# Patient Record
Sex: FEMALE | Race: BLACK OR AFRICAN AMERICAN | Employment: UNEMPLOYED | URBAN - METROPOLITAN AREA
[De-identification: names, ages, dates, MRNs, and addresses within clinical notes are randomized per-mention and may not be internally consistent; named-entity substitution may affect disease eponyms.]

---

## 2019-02-28 ENCOUNTER — OFFICE VISIT (OUTPATIENT)
Dept: FAMILY MEDICINE CLINIC | Age: 49
End: 2019-02-28

## 2019-02-28 VITALS
HEIGHT: 67 IN | DIASTOLIC BLOOD PRESSURE: 96 MMHG | OXYGEN SATURATION: 97 % | RESPIRATION RATE: 16 BRPM | SYSTOLIC BLOOD PRESSURE: 177 MMHG | BODY MASS INDEX: 29.82 KG/M2 | HEART RATE: 107 BPM | TEMPERATURE: 97.7 F | WEIGHT: 190 LBS

## 2019-02-28 DIAGNOSIS — I10 ESSENTIAL HYPERTENSION: Primary | ICD-10-CM

## 2019-02-28 DIAGNOSIS — J45.909 UNCOMPLICATED ASTHMA, UNSPECIFIED ASTHMA SEVERITY, UNSPECIFIED WHETHER PERSISTENT: ICD-10-CM

## 2019-02-28 DIAGNOSIS — Z79.899 POLYPHARMACY: ICD-10-CM

## 2019-02-28 DIAGNOSIS — E55.9 VITAMIN D DEFICIENCY: ICD-10-CM

## 2019-02-28 DIAGNOSIS — M50.90 CERVICAL DISC DISORDER: ICD-10-CM

## 2019-02-28 DIAGNOSIS — F31.9 BIPOLAR DEPRESSION (HCC): ICD-10-CM

## 2019-02-28 DIAGNOSIS — F20.9 SCHIZOPHRENIA, UNSPECIFIED TYPE (HCC): ICD-10-CM

## 2019-02-28 DIAGNOSIS — G43.909 MIGRAINE WITHOUT STATUS MIGRAINOSUS, NOT INTRACTABLE, UNSPECIFIED MIGRAINE TYPE: ICD-10-CM

## 2019-02-28 DIAGNOSIS — E03.9 HYPOTHYROIDISM, UNSPECIFIED TYPE: ICD-10-CM

## 2019-02-28 DIAGNOSIS — K21.9 GASTROESOPHAGEAL REFLUX DISEASE, ESOPHAGITIS PRESENCE NOT SPECIFIED: ICD-10-CM

## 2019-02-28 RX ORDER — PREGABALIN 75 MG/1
CAPSULE ORAL
COMMUNITY
End: 2019-04-09

## 2019-02-28 RX ORDER — BENZONATATE 200 MG/1
200 CAPSULE ORAL
COMMUNITY
End: 2019-03-13 | Stop reason: ALTCHOICE

## 2019-02-28 RX ORDER — CYCLOBENZAPRINE HCL 5 MG
5 TABLET ORAL
COMMUNITY
End: 2019-04-09

## 2019-02-28 RX ORDER — ALBUTEROL SULFATE 90 UG/1
AEROSOL, METERED RESPIRATORY (INHALATION)
COMMUNITY
End: 2019-02-28 | Stop reason: SDUPTHER

## 2019-02-28 RX ORDER — ALFUZOSIN HYDROCHLORIDE 10 MG/1
TABLET, EXTENDED RELEASE ORAL DAILY
COMMUNITY
End: 2019-04-09

## 2019-02-28 RX ORDER — RIZATRIPTAN BENZOATE 10 MG/1
10 TABLET ORAL
COMMUNITY
End: 2019-02-28 | Stop reason: SDUPTHER

## 2019-02-28 RX ORDER — FLUTICASONE PROPIONATE AND SALMETEROL 250; 50 UG/1; UG/1
1 POWDER RESPIRATORY (INHALATION) EVERY 12 HOURS
Qty: 14 EACH | Refills: 1 | Status: SHIPPED | OUTPATIENT
Start: 2019-02-28 | End: 2019-04-01

## 2019-02-28 RX ORDER — HYDROXYZINE 25 MG/1
25 TABLET, FILM COATED ORAL
Qty: 90 TAB | Refills: 1 | Status: SHIPPED | OUTPATIENT
Start: 2019-02-28 | End: 2019-03-10

## 2019-02-28 RX ORDER — FLUTICASONE PROPIONATE AND SALMETEROL 250; 50 UG/1; UG/1
1 POWDER RESPIRATORY (INHALATION) EVERY 12 HOURS
COMMUNITY
End: 2019-02-28 | Stop reason: SDUPTHER

## 2019-02-28 RX ORDER — HYDROCHLOROTHIAZIDE 25 MG/1
25 TABLET ORAL DAILY
COMMUNITY
End: 2019-02-28 | Stop reason: SDUPTHER

## 2019-02-28 RX ORDER — LOSARTAN POTASSIUM 50 MG/1
50 TABLET ORAL
COMMUNITY
Start: 2018-05-23 | End: 2019-02-28 | Stop reason: SDUPTHER

## 2019-02-28 RX ORDER — ALBUTEROL SULFATE 90 UG/1
2 AEROSOL, METERED RESPIRATORY (INHALATION)
Qty: 1 INHALER | Refills: 1 | Status: SHIPPED | OUTPATIENT
Start: 2019-02-28 | End: 2019-02-28 | Stop reason: ALTCHOICE

## 2019-02-28 RX ORDER — FESOTERODINE FUMARATE 8 MG/1
8 TABLET, EXTENDED RELEASE ORAL
COMMUNITY
Start: 2018-05-05

## 2019-02-28 RX ORDER — HYDROXYZINE 25 MG/1
TABLET, FILM COATED ORAL
COMMUNITY
End: 2019-02-28 | Stop reason: SDUPTHER

## 2019-02-28 RX ORDER — LEVOTHYROXINE SODIUM 75 UG/1
TABLET ORAL
COMMUNITY
End: 2019-02-28 | Stop reason: SDUPTHER

## 2019-02-28 RX ORDER — ALBUTEROL SULFATE 90 UG/1
AEROSOL, METERED RESPIRATORY (INHALATION)
COMMUNITY
End: 2019-02-28 | Stop reason: ALTCHOICE

## 2019-02-28 RX ORDER — ALFUZOSIN HYDROCHLORIDE 10 MG/1
TABLET, EXTENDED RELEASE ORAL DAILY
Status: CANCELLED | OUTPATIENT
Start: 2019-02-28

## 2019-02-28 RX ORDER — ALBUTEROL SULFATE 90 UG/1
1-2 AEROSOL, METERED RESPIRATORY (INHALATION)
COMMUNITY
Start: 2019-02-19 | End: 2019-02-28 | Stop reason: ALTCHOICE

## 2019-02-28 RX ORDER — TRAZODONE HYDROCHLORIDE 150 MG/1
150 TABLET ORAL
COMMUNITY
End: 2019-02-28 | Stop reason: SDUPTHER

## 2019-02-28 RX ORDER — SUMATRIPTAN 100 MG/1
100 TABLET, FILM COATED ORAL
COMMUNITY
Start: 2018-05-23

## 2019-02-28 RX ORDER — TRAZODONE HYDROCHLORIDE 150 MG/1
150 TABLET ORAL
Qty: 90 TAB | Refills: 1 | Status: SHIPPED | OUTPATIENT
Start: 2019-02-28 | End: 2019-04-01

## 2019-02-28 RX ORDER — ALPRAZOLAM 1 MG/1
TABLET ORAL
Status: CANCELLED | OUTPATIENT
Start: 2019-02-28

## 2019-02-28 RX ORDER — ONDANSETRON 4 MG/1
4 TABLET, FILM COATED ORAL
COMMUNITY

## 2019-02-28 RX ORDER — RANITIDINE 300 MG/1
TABLET ORAL 2 TIMES DAILY
COMMUNITY
End: 2019-02-28 | Stop reason: SDUPTHER

## 2019-02-28 RX ORDER — LEVOTHYROXINE SODIUM 75 UG/1
75 TABLET ORAL
Qty: 90 TAB | Refills: 1 | Status: SHIPPED | OUTPATIENT
Start: 2019-02-28

## 2019-02-28 RX ORDER — HYDROCHLOROTHIAZIDE 25 MG/1
25 TABLET ORAL DAILY
Qty: 90 TAB | Refills: 1 | Status: SHIPPED | OUTPATIENT
Start: 2019-02-28

## 2019-02-28 RX ORDER — ALBUTEROL SULFATE 90 UG/1
2 AEROSOL, METERED RESPIRATORY (INHALATION)
Qty: 1 INHALER | Refills: 3 | Status: SHIPPED | OUTPATIENT
Start: 2019-02-28

## 2019-02-28 RX ORDER — SIMVASTATIN 20 MG/1
20 TABLET, FILM COATED ORAL
COMMUNITY
Start: 2018-05-05

## 2019-02-28 RX ORDER — RANITIDINE 300 MG/1
300 TABLET ORAL DAILY
Qty: 90 TAB | Refills: 1 | Status: SHIPPED | OUTPATIENT
Start: 2019-02-28

## 2019-02-28 RX ORDER — RIZATRIPTAN BENZOATE 10 MG/1
10 TABLET ORAL
Qty: 9 TAB | Refills: 6 | Status: SHIPPED | OUTPATIENT
Start: 2019-02-28 | End: 2019-02-28

## 2019-02-28 RX ORDER — ALPRAZOLAM 1 MG/1
TABLET ORAL
COMMUNITY
End: 2019-04-09

## 2019-02-28 RX ORDER — LOSARTAN POTASSIUM 50 MG/1
50 TABLET ORAL DAILY
Qty: 90 TAB | Refills: 1 | Status: SHIPPED | OUTPATIENT
Start: 2019-02-28 | End: 2019-03-13 | Stop reason: ALTCHOICE

## 2019-02-28 RX ORDER — MORPHINE SULFATE 30 MG/1
TABLET, FILM COATED, EXTENDED RELEASE ORAL
COMMUNITY
Start: 2017-06-10 | End: 2019-04-09

## 2019-02-28 RX ORDER — CHOLECALCIFEROL TAB 125 MCG (5000 UNIT) 125 MCG
TAB ORAL DAILY
COMMUNITY

## 2019-02-28 NOTE — PROGRESS NOTES
[facility]    CC: EOC    HPI:     HTN:  -Not logging BP at home  -Currently only taking losartan  -States her BP was well controlled when was on 4-5 BP medications      Hypothyroidism:  -Reports TSH has not been checked for a long time  -Taking Synthroid as prescribed  -Denies any side effects or issues the medication      Bipolar Depression/Schizophrenia:  -Currently taking Latuda  -Denies any side effects or issues the medication  -Reports it works well  -Also reports issues with anxiety, PTSD, and insomnia  -Taking trazodone to help her sleep      Asthma:  -Currently reports using Advair and Albuterol  -Denies any issues with the medications      Migraines:  -Reports a history of very severe migraines  -Migraines have been difficult to abort  -Rizatriptan has been the only medication effective in stopping the migraines, but thinks it is starting to lose its effectiveness      Chronic Pain:  -Reports chronic back and neck pain  -States that neck pain is notably severe due to failed cervical fusion  -Reports she was going to have surgery  -Needs referral to specialist      ROS: Positive items marked in RED  CON: fever, chills  Cardiovascular: palpitations, CP  Resp: SOB, cough  GI: nausea, vomiting, diarrhea  : dysuria, hematuria      Past Medical History:   Diagnosis Date    Anemia     Arthritis     Asthma     [facility] disorder ([facility] [address]     Cervical herniated disc     Chronic kidney disease     Chronic pain     GERD (gastroesophageal reflux disease)     Heart disease     Hypertension     Lumbar herniated disc     Polypharmacy     Schizophrenia ([facility] [address]        Past Surgical History:   Procedure Laterality Date    HX CERVICAL FUSION      HX GYN      HX HEENT      HX LUMBAR FUSION      HX ORTHOPAEDIC         Family History   Problem Relation Age of Onset    Heart Disease Mother     Anemia Mother     Diabetes Mother     Diabetes Father        Social History     Socioeconomic History  Marital status: UNKNOWN     Spouse name: Not on file    Number of children: Not on file    Years of education: Not on file    Highest education level: Not on file   Tobacco Use    Smoking status: Former Smoker    Smokeless tobacco: Never Used   Substance and Sexual Activity    Alcohol use: No     Frequency: Never    Drug use: No    Sexual activity: Not Currently       Allergies   Allergen Reactions    Amlodipine Other (comments)     Unable to sleep         Current Outpatient Medications:     simvastatin (ZOCOR) 20 mg tablet, Take 20 mg by mouth., Disp: , Rfl:     SUMAtriptan (IMITREX) 100 mg tablet, Take 100 mg by mouth., Disp: , Rfl:     fesoterodine (TOVIAZ) 8 mg ER tablet, Take 8 mg by mouth., Disp: , Rfl:     morphine CR (MS CONTIN) 30 mg CR tablet, TAKE 1 TABLET BY MOUTH TWICE A DAY X1 MONTH (30 DAYS), Disp: , Rfl:     losartan (COZAAR) 50 mg tablet, Take 50 mg by mouth., Disp: , Rfl:     ondansetron hcl (ZOFRAN) 4 mg tablet, Take 4 mg by mouth every eight (8) hours as needed for Nausea., Disp: , Rfl:     raNITIdine (ZANTAC) 300 mg tab, Take  by mouth two (2) times a day., Disp: , Rfl:     hydrOXYzine HCl (ATARAX) 25 mg tablet, Take  by mouth three (3) times daily as needed for Itching., Disp: , Rfl:     pregabalin (LYRICA) 75 mg capsule, Take  by mouth., Disp: , Rfl:     cholecalciferol, VITAMIN D3, (VITAMIN D3) 5,000 unit tab tablet, Take  by mouth daily. , Disp: , Rfl:     alfuzosin SR (UROXATRAL) 10 mg SR tablet, Take  by mouth daily. , Disp: , Rfl:     lactulose (CHRONULAC) 10 gram/15 mL solution, Take  by mouth three (3) times daily. , Disp: , Rfl:     benzonatate (TESSALON) 200 mg capsule, Take 200 mg by mouth three (3) times daily as needed for Cough. , Disp: , Rfl:     cyclobenzaprine (FLEXERIL) 5 mg tablet, Take 5 mg by mouth., Disp: , Rfl:     ALPRAZolam (XANAX) 1 mg tablet, Take  by mouth., Disp: , Rfl:     hydroCHLOROthiazide (HYDRODIURIL) 25 mg tablet, Take 1 Tab by mouth daily., Disp: 90 Tab, Rfl: 1    traZODone (DESYREL) 150 mg tablet, Take 1 Tab by mouth nightly., Disp: 90 Tab, Rfl: 1    levothyroxine (SYNTHROID) 75 mcg tablet, Take 1 Tab by mouth Daily (before breakfast). , Disp: 90 Tab, Rfl: 1    rizatriptan (MAXALT) 10 mg tablet, Take 1 Tab by mouth once as needed for Migraine for up to 1 dose. May repeat in 2 hours if needed, Disp: 9 Tab, Rfl: 6    lurasidone (LATUDA) 40 mg tab tablet, Take 1 Tab by mouth daily (with breakfast). , Disp: 90 Tab, Rfl: 1    fluticasone-salmeterol (ADVAIR DISKUS) 250-50 mcg/dose diskus inhaler, Take 1 Puff by inhalation every twelve (12) hours. , Disp: 14 Each, Rfl: 1    albuterol (PROVENTIL HFA, VENTOLIN HFA, PROAIR HFA) 90 mcg/actuation inhaler, Take 2 Puffs by inhalation every four (4) hours as needed for Wheezing., Disp: 1 Inhaler, Rfl: 3    Physical Exam:      BP (!) 177/96   Pulse (!) 107   Temp 97.7 °F (36.5 °C) (Oral)   Resp 16   Ht 5' 7\" (1.702 m)   Wt 190 lb (86.2 kg)   SpO2 97%   BMI 29.76 kg/m²     General:  WD, WN, NAD, conversant  Eyes: sclera clear bilaterally, no discharge noted, eyelids normal in appearance  HENT: NCAT  Lungs: CTAB, normal respiratory effort and rate  CV: RRR, no MRGs  ABD: soft, non-tender, non-distended, normal bowel sounds  Skin: normal temperature, turgor, color, and texture  Psych: alert and oriented to person, place and situation, seems very anxious, constantly shaking leg  Neuro: speech normal, moving all extremities, gait normal      Assessment/Plan     Essential Hypertension, inadequately controlled:  -Will continue current losartan regimen  -Will resume prior HCTZ regimen  -Advised to start logging home BP  -CMP and CBC  -Follow-up in 1 month      Bipolar depression/Schizophrenia:  -Given referral list to local psychiatrist  -Will continue current trazodone, Latuda, and hydroxyzine regimen  -Follow-up in 1 month      Migraines:  -Will continue rizatriptan for abortive therapy  -Follow-up in 1 month      Vitamin D Deficiency:  -Vitamin D level ordered  -Follow-up in 1 month      Hypothyroidism:  -Will continue current Synthroid regimen  -TSH ordered  -Follow-up in 1 month      Asthma:  -Will continue current controller regimen of Advair  -PRN albuterol inhaler ordered  -Follow-up in 1 month      Cervical Disc Disorder:  -Will refer to orthopedic specialist for further evaluation  -Follow-up in 1 month      GERD:  -Will continue current ranitidine regimen  -Follow-up in 1 month      Polypharmacy:  -Discussed with patient that she was on too many medications and had multiple interactions  -Will not refill of her medications and will work to try to reduce her medication list  -Follow-up in 1 month        Esthela Strickland MD  2/28/2019, 10:12 AM

## 2019-02-28 NOTE — PROGRESS NOTES
Chief Complaint   Patient presents with   Ivett Gimenezer Establish Care     1. Have you been to the ER, urgent care clinic since your last visit? Hospitalized since your last visit? Went to [facility] 1 week ago. 2. Have you seen or consulted any other health care providers outside of the [address] [place] [address] since your last visit? Include any pap smears or colon screening. Saw Orthopedic doctor on 2/27/2019. Health Maintenance:  · Need order for mammogram. Last Mammogram was 4 years ago. · Need referral for colonoscopy screening. · Last PAP is unknown. Patient had full hysterectomy. · Script needed for Shingrix vaccine.     3 most recent PHQ Screens 2/28/2019   Little interest or pleasure in doing things Nearly every day   Feeling down, depressed, irritable, or hopeless Nearly every day   Total Score PHQ 2 6     Visit Vitals  BP (!) 177/96   Pulse (!) 107   Temp 97.7 °F (36.5 °C) (Oral)   Resp 16   Ht 5' 7\" (1.702 m)   Wt 190 lb (86.2 kg)   SpO2 97%   BMI 29.76 kg/m²

## 2019-03-01 LAB
25(OH)D3+25(OH)D2 SERPL-MCNC: 54.1 NG/ML (ref 30–100)
ALBUMIN SERPL-MCNC: 4.6 G/DL (ref 3.5–5.5)
ALBUMIN/GLOB SERPL: 1.7 {RATIO} (ref 1.2–2.2)
ALP SERPL-CCNC: 71 IU/L (ref 39–117)
ALT SERPL-CCNC: 20 IU/L (ref 0–32)
AST SERPL-CCNC: 20 IU/L (ref 0–40)
BASOPHILS # BLD AUTO: 0 X10E3/UL (ref 0–0.2)
BASOPHILS NFR BLD AUTO: 0 %
BILIRUB SERPL-MCNC: 0.4 MG/DL (ref 0–1.2)
BUN SERPL-MCNC: 8 MG/DL (ref 6–24)
BUN/CREAT SERPL: 6 (ref 9–23)
CALCIUM SERPL-MCNC: 9.9 MG/DL (ref 8.7–10.2)
CHLORIDE SERPL-SCNC: 95 MMOL/L (ref 96–106)
CO2 SERPL-SCNC: 30 MMOL/L (ref 20–29)
CREAT SERPL-MCNC: 1.43 MG/DL (ref 0.57–1)
EOSINOPHIL # BLD AUTO: 0.1 X10E3/UL (ref 0–0.4)
EOSINOPHIL NFR BLD AUTO: 2 %
ERYTHROCYTE [DISTWIDTH] IN BLOOD BY AUTOMATED COUNT: 16.8 % (ref 12.3–15.4)
GLOBULIN SER CALC-MCNC: 2.7 G/DL (ref 1.5–4.5)
GLUCOSE SERPL-MCNC: 103 MG/DL (ref 65–99)
HCT VFR BLD AUTO: 33.3 % (ref 34–46.6)
HGB BLD-MCNC: 10.3 G/DL (ref 11.1–15.9)
IMM GRANULOCYTES # BLD AUTO: 0 X10E3/UL (ref 0–0.1)
IMM GRANULOCYTES NFR BLD AUTO: 0 %
INTERPRETATION: NORMAL
LYMPHOCYTES # BLD AUTO: 2.3 X10E3/UL (ref 0.7–3.1)
LYMPHOCYTES NFR BLD AUTO: 51 %
MCH RBC QN AUTO: 22.5 PG (ref 26.6–33)
MCHC RBC AUTO-ENTMCNC: 30.9 G/DL (ref 31.5–35.7)
MCV RBC AUTO: 73 FL (ref 79–97)
MONOCYTES # BLD AUTO: 0.3 X10E3/UL (ref 0.1–0.9)
MONOCYTES NFR BLD AUTO: 8 %
NEUTROPHILS # BLD AUTO: 1.7 X10E3/UL (ref 1.4–7)
NEUTROPHILS NFR BLD AUTO: 39 %
PLATELET # BLD AUTO: 244 X10E3/UL (ref 150–379)
POTASSIUM SERPL-SCNC: 3.6 MMOL/L (ref 3.5–5.2)
PROT SERPL-MCNC: 7.3 G/DL (ref 6–8.5)
RBC # BLD AUTO: 4.58 X10E6/UL (ref 3.77–5.28)
SODIUM SERPL-SCNC: 140 MMOL/L (ref 134–144)
TSH SERPL DL<=0.005 MIU/L-ACNC: 0.45 UIU/ML (ref 0.45–4.5)
WBC # BLD AUTO: 4.4 X10E3/UL (ref 3.4–10.8)

## 2019-03-13 ENCOUNTER — OFFICE VISIT (OUTPATIENT)
Dept: FAMILY MEDICINE CLINIC | Age: 49
End: 2019-03-13

## 2019-03-13 VITALS
TEMPERATURE: 98.1 F | RESPIRATION RATE: 14 BRPM | HEART RATE: 105 BPM | BODY MASS INDEX: 29.66 KG/M2 | HEIGHT: 67 IN | WEIGHT: 189 LBS | DIASTOLIC BLOOD PRESSURE: 110 MMHG | SYSTOLIC BLOOD PRESSURE: 174 MMHG | OXYGEN SATURATION: 98 %

## 2019-03-13 DIAGNOSIS — J06.9 UPPER RESPIRATORY TRACT INFECTION, UNSPECIFIED TYPE: ICD-10-CM

## 2019-03-13 DIAGNOSIS — J45.901 EXACERBATION OF PERSISTENT ASTHMA, UNSPECIFIED ASTHMA SEVERITY: Primary | ICD-10-CM

## 2019-03-13 DIAGNOSIS — I10 ESSENTIAL HYPERTENSION: ICD-10-CM

## 2019-03-13 RX ORDER — CLONIDINE HYDROCHLORIDE 0.1 MG/1
0.1 TABLET ORAL 2 TIMES DAILY
Qty: 60 TAB | Refills: 1 | Status: SHIPPED | OUTPATIENT
Start: 2019-03-13 | End: 2019-04-09 | Stop reason: DRUGHIGH

## 2019-03-13 RX ORDER — PREDNISONE 20 MG/1
60 TABLET ORAL
Qty: 21 TAB | Refills: 0 | Status: SHIPPED | OUTPATIENT
Start: 2019-03-13 | End: 2019-04-02

## 2019-03-13 RX ORDER — LOSARTAN POTASSIUM 100 MG/1
100 TABLET ORAL DAILY
Qty: 90 TAB | Refills: 1 | Status: SHIPPED | OUTPATIENT
Start: 2019-03-13 | End: 2019-04-01

## 2019-03-13 RX ORDER — BROMPHENIRAMINE MALEATE, PSEUDOEPHEDRINE HYDROCHLORIDE, AND DEXTROMETHORPHAN HYDROBROMIDE 2; 30; 10 MG/5ML; MG/5ML; MG/5ML
5 SYRUP ORAL
Qty: 118 ML | Refills: 0 | Status: SHIPPED | OUTPATIENT
Start: 2019-03-13 | End: 2019-04-01 | Stop reason: SDUPTHER

## 2019-03-13 NOTE — PATIENT INSTRUCTIONS

## 2019-03-13 NOTE — PROGRESS NOTES
Chief Complaint   Patient presents with    URI    Elevated Blood Pressure    Migraine     1. Have you been to the ER, urgent care clinic since your last visit? Hospitalized since your last visit? No.    2. Have you seen or consulted any other health care providers outside of the [address] [place] [address] since your last visit?  No.    Visit Vitals  BP (!) 174/110   Pulse (!) 105   Temp 98.1 °F (36.7 °C) (Oral)   Resp 14   Ht 5' 7\" (1.702 m)   Wt 189 lb (85.7 kg)   SpO2 98%   BMI 29.60 kg/m²

## 2019-03-13 NOTE — PROGRESS NOTES
[facility]    CC: Cough    HPI:     Cough:  -Productive (Slight/small amount of sputum. Sputum is clear to yellow-green)  -Reports exposure to sick grandchildren (Lives with her)  -Has been using prescribed Advair, albuterol, and Tessalon  -Reports associated SOB, wheezing, chills, vomiting, chest tightness, rhinorrhea, and nasal congestion  -Of note, she quit smoking 5 months ago      HTN:  -Taking HCTZ as prescribed  -Reports increased urinary frequency from the medication. Does not wish to increase the dose, due to this side effect  -Has been taking losartan BID due to elevated BP. Denies any side effect from the medication.  -Has been checking BP at home. Log brought in for review.  All readings were stage II HTN except one low BP reading of 96/54  -Home BP range of /      ROS: Positive items marked in RED  CON: fever, chills  Cardiovascular: palpitations, CP  Resp: SOB, cough  GI: vomiting, diarrhea  : dysuria, hematuria      Past Medical History:   Diagnosis Date    Anemia     Arthritis     Asthma     Bipolar disorder (HCC)     Cervical herniated disc     Chronic kidney disease     Chronic pain     GERD (gastroesophageal reflux disease)     Heart disease     Hypertension     Hypothyroidism     Lumbar herniated disc     Polypharmacy     PTSD (post-traumatic stress disorder)     Schizophrenia ([facility] [address]        Past Surgical History:   Procedure Laterality Date    HX CERVICAL FUSION      HX GYN      HX HEENT      HX LUMBAR FUSION      HX ORTHOPAEDIC         Family History   Problem Relation Age of Onset    Heart Disease Mother     Anemia Mother     Diabetes Mother     Diabetes Father        Social History     Socioeconomic History    Marital status: UNKNOWN     Spouse name: Not on file    Number of children: Not on file    Years of education: Not on file    Highest education level: Not on file   Tobacco Use    Smoking status: Former Smoker    Smokeless tobacco: Never Used   Substance and Sexual Activity    Alcohol use: No     Frequency: Never    Drug use: No    Sexual activity: Not Currently       Allergies   Allergen Reactions    Amlodipine Other (comments)     Unable to sleep         Current Outpatient Medications:     simvastatin (ZOCOR) 20 mg tablet, Take 20 mg by mouth., Disp: , Rfl:     SUMAtriptan (IMITREX) 100 mg tablet, Take 100 mg by mouth., Disp: , Rfl:     fesoterodine (TOVIAZ) 8 mg ER tablet, Take 8 mg by mouth., Disp: , Rfl:     morphine CR (MS CONTIN) 30 mg CR tablet, TAKE 1 TABLET BY MOUTH TWICE A DAY X1 MONTH (30 DAYS), Disp: , Rfl:     ondansetron hcl (ZOFRAN) 4 mg tablet, Take 4 mg by mouth every eight (8) hours as needed for Nausea., Disp: , Rfl:     pregabalin (LYRICA) 75 mg capsule, Take  by mouth., Disp: , Rfl:     cholecalciferol, VITAMIN D3, (VITAMIN D3) 5,000 unit tab tablet, Take  by mouth daily. , Disp: , Rfl:     alfuzosin SR (UROXATRAL) 10 mg SR tablet, Take  by mouth daily. , Disp: , Rfl:     lactulose (CHRONULAC) 10 gram/15 mL solution, Take  by mouth three (3) times daily. , Disp: , Rfl:     benzonatate (TESSALON) 200 mg capsule, Take 200 mg by mouth three (3) times daily as needed for Cough. , Disp: , Rfl:     cyclobenzaprine (FLEXERIL) 5 mg tablet, Take 5 mg by mouth., Disp: , Rfl:     ALPRAZolam (XANAX) 1 mg tablet, Take  by mouth., Disp: , Rfl:     hydroCHLOROthiazide (HYDRODIURIL) 25 mg tablet, Take 1 Tab by mouth daily. , Disp: 90 Tab, Rfl: 1    traZODone (DESYREL) 150 mg tablet, Take 1 Tab by mouth nightly., Disp: 90 Tab, Rfl: 1    levothyroxine (SYNTHROID) 75 mcg tablet, Take 1 Tab by mouth Daily (before breakfast). , Disp: 90 Tab, Rfl: 1    lurasidone (LATUDA) 40 mg tab tablet, Take 1 Tab by mouth daily (with breakfast). , Disp: 90 Tab, Rfl: 1    fluticasone-salmeterol (ADVAIR DISKUS) 250-50 mcg/dose diskus inhaler, Take 1 Puff by inhalation every twelve (12) hours. , Disp: 14 Each, Rfl: 1    albuterol (PROVENTIL HFA, VENTOLIN HFA, PROAIR HFA) 90 mcg/actuation inhaler, Take 2 Puffs by inhalation every four (4) hours as needed for Wheezing., Disp: 1 Inhaler, Rfl: 3    raNITIdine (ZANTAC) 300 mg tab, Take 1 Tab by mouth daily. , Disp: 90 Tab, Rfl: 1    losartan (COZAAR) 50 mg tablet, Take 1 Tab by mouth daily. , Disp: 90 Tab, Rfl: 1    Physical Exam:      BP (!) 174/110   Pulse (!) 105   Temp 98.1 °F (36.7 °C) (Oral)   Resp 14   Ht 5' 7\" (1.702 m)   Wt 189 lb (85.7 kg)   SpO2 98%   BMI 29.60 kg/m²     General:  WD, WN, NAD, conversant  Eyes: sclera clear bilaterally, no discharge noted, eyelids normal in appearance  HENT: NCAT, nasal turbinates enlarged bilaterally, oropharynx clear, MMM  Lungs: slight end expiratory wheeze noted bilaterally, normal respiratory effort and rate  CV: tachycardic, no MRGs  ABD: soft, non-tender, non-distended, normal bowel sounds  Skin: normal temperature, turgor, color, and texture  Psych: alert and oriented to person, place and situation, normal affect  Neuro: speech normal, moving all extremities, gait normal      Assessment/Plan     Asthma Exacerbation, Mild:  -2/2 URI  -Will start on short course of prednisone  -Dimetapp ordered for URI symptoms  -D/C'd Tessalon  -Handout given on URI care  -F/U in one week if symptoms worsen or fail to improve      HTN, Inadequately Controlled:  -Current illness likely further exacerbating BP  -Will officially increase losartan dose to 100 mg daily  -Clonidine added to current regimen  -F/U at already scheduled appointment for chronic disease management        Karen Reeves MD  3/13/2019, 10:48 AM

## 2019-03-28 ENCOUNTER — OFFICE VISIT (OUTPATIENT)
Dept: FAMILY MEDICINE CLINIC | Age: 49
End: 2019-03-28

## 2019-03-28 VITALS — HEIGHT: 67 IN | BODY MASS INDEX: 29.6 KG/M2

## 2019-03-28 NOTE — PROGRESS NOTES
Chief Complaint   Patient presents with    Follow-up     1 month follow up on essential HTN, bipolar depression, migraines, asthma, GERD, cervical disc disorder, polypharmacy, vitamin d deficiency and hypothyroidism     1. Have you been to the ER, urgent care clinic since your last visit? Hospitalized since your last visit? 2. Have you seen or consulted any other health care providers outside of the [address] [place] [address] since your last visit? Include any pap smears or colon screening.

## 2019-04-01 ENCOUNTER — OFFICE VISIT (OUTPATIENT)
Dept: FAMILY MEDICINE CLINIC | Age: 49
End: 2019-04-01

## 2019-04-01 VITALS
RESPIRATION RATE: 16 BRPM | HEIGHT: 67 IN | BODY MASS INDEX: 30.61 KG/M2 | HEART RATE: 107 BPM | TEMPERATURE: 99.4 F | WEIGHT: 195 LBS | SYSTOLIC BLOOD PRESSURE: 143 MMHG | OXYGEN SATURATION: 99 % | DIASTOLIC BLOOD PRESSURE: 87 MMHG
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
## 2019-04-01 DIAGNOSIS — I10 ESSENTIAL HYPERTENSION: ICD-10-CM

## 2019-04-01 DIAGNOSIS — F31.9 BIPOLAR DEPRESSION (HCC): ICD-10-CM

## 2019-04-01 DIAGNOSIS — J06.9 UPPER RESPIRATORY TRACT INFECTION, UNSPECIFIED TYPE: ICD-10-CM

## 2019-04-01 DIAGNOSIS — I95.1 ORTHOSTATIC HYPOTENSION: Primary | ICD-10-CM

## 2019-04-01 RX ORDER — BROMPHENIRAMINE MALEATE, PSEUDOEPHEDRINE HYDROCHLORIDE, AND DEXTROMETHORPHAN HYDROBROMIDE 2; 30; 10 MG/5ML; MG/5ML; MG/5ML
5 SYRUP ORAL
Qty: 118 ML | Refills: 0 | Status: SHIPPED | OUTPATIENT
Start: 2019-04-01 | End: 2019-04-06

## 2019-04-01 RX ORDER — LOSARTAN POTASSIUM 50 MG/1
50 TABLET ORAL DAILY
Qty: 30 TAB | Refills: 0 | Status: SHIPPED | OUTPATIENT
Start: 2019-04-01 | End: 2019-04-09 | Stop reason: CLARIF

## 2019-04-01 RX ORDER — TRAZODONE HYDROCHLORIDE 150 MG/1
300 TABLET ORAL
Qty: 90 TAB | Refills: 0 | Status: SHIPPED | OUTPATIENT
Start: 2019-04-01

## 2019-04-01 RX ORDER — FLUTICASONE PROPIONATE AND SALMETEROL 250; 50 UG/1; UG/1
POWDER RESPIRATORY (INHALATION)
COMMUNITY
Start: 2019-02-28

## 2019-04-01 RX ORDER — ALBUTEROL SULFATE 90 UG/1
AEROSOL, METERED RESPIRATORY (INHALATION)
COMMUNITY
Start: 2019-02-28 | End: 2019-04-01

## 2019-04-01 NOTE — PROGRESS NOTES
1. Have you been to the ER, urgent care clinic since your last visit? Hospitalized since your last visit? No    2. Have you seen or consulted any other health care providers outside of the [address] [place] [address] since your last visit? Include any pap smears or colon screening. No     Chief Complaint   Patient presents with    Cold Symptoms     increase cough for 2 weeks, syncope with a fall, dizzness     Medication Evaluation     trazodone      Visit Vitals  /87 (BP 1 Location: Left arm, BP Patient Position: At rest)   Pulse (!) 107   Temp 99.4 °F (37.4 °C)   Resp 16   Ht 5' 7\" (1.702 m)   Wt 195 lb (88.5 kg)   SpO2 99%   BMI 30.54 kg/m²         Patient had orthostat b/p order    Lying 135/83 ;  Sittin/79; Standing 108/69

## 2019-04-01 NOTE — PATIENT INSTRUCTIONS
Orthostatic Hypotension: Care Instructions  Your Care Instructions    Orthostatic hypotension is a quick drop in blood pressure. It happens when you get up from sitting or lying down. You may feel faint, lightheaded, or dizzy. When a person sits up or stands up, the body changes the way it pumps blood. This can slow the flow of blood to the brain for a very short time. And that can make you feel lightheaded. Many medicines can cause this problem, especially in older people. Lack of fluids (dehydration) or illnesses such as diabetes or heart disease also can cause it. Follow-up care is a key part of your treatment and safety. Be sure to make and go to all appointments, and call your doctor if you are having problems. It's also a good idea to know your test results and keep a list of the medicines you take. How can you care for yourself at home? · Tell your doctor about any problems you have with your medicines. · If your doctor prescribes medicine to help prevent a low blood pressure problem, take it exactly as prescribed. Call your doctor if you think you are having a problem with your medicine. · Drink plenty of fluids, enough so that your urine is light yellow or clear like water. Choose water and other caffeine-free clear liquids. If you have kidney, heart, or liver disease and have to limit fluids, talk with your doctor before you increase the amount of fluids you drink. · Limit or avoid alcohol and caffeine. · Get up slowly from bed or after sitting for a long time. If you are in bed, roll to your side and swing your legs over the edge of the bed and onto the floor. Push your body up to a sitting position. Wait for a while before you slowly stand up. If you are dizzy or lightheaded, sit or lie down. When should you call for help? Call 911 anytime you think you may need emergency care.  For example, call if:    · You passed out (lost consciousness).    Watch closely for changes in your health, and be sure to contact your doctor if:    · You do not get better as expected. Where can you learn more? Go to http://leda-candido.info/. Enter G605 in the search box to learn more about \"Orthostatic Hypotension: Care Instructions. \"  Current as of: July 22, 2018  Content Version: 11.9  © 0126-5038 [employer]. Care instructions adapted under license by [employer] (which disclaims liability or warranty for this information). If you have questions about a medical condition or this instruction, always ask your healthcare professional. [address] any warranty or liability for your use of this information.

## 2019-04-01 NOTE — PROGRESS NOTES
Subjective: Michelle Bajwa is a 44 y.o. female who complains of coryza, congestion, sneezing, sore throat, productive cough, cough described as productive of yellow sputum, myalgias, generalized sinus pain and bilateral ear fullness, blockage for 14 days, unchanged since that time. She denies a history of anorexia, chest pain, chills and fevers. Evaluation to date: seen previously and thought to have a viral URI. Treatment to date: see previous note. Patient does not smoke cigarettes. Relevant PMH: Asthma. Fall  Was coming back into the house and fell. Got real dizzy and lightheaded and weak after sitting. 2nd time was going to take shower, by the time she got to the bathroom was tired exhausted and weak and was lightheaded and dizzy. Makenna Main in the shower. Did not hit head. 3rd timesame ast the 2nd time. Still gets exhausted and weak with activity. The falls are preceded by everything goint white. States she is staying hydrated.     Home b/p: 123/80's       Patient Active Problem List   Diagnosis Code    Polypharmacy Z79.899    Bipolar disorder ([facility] F31.9    Schizophrenia ([facility] F20.9    Arthritis M19.90    Asthma J45.909    Chronic kidney disease N18.9    Heart disease I51.9    Anemia D64.9    Hypertension I10    Cervical herniated disc M50.20    Lumbar herniated disc M51.26    Chronic pain G89.29    GERD (gastroesophageal reflux disease) K21.9    PTSD (post-traumatic stress disorder) F43.10    Hypothyroidism E03.9     Patient Active Problem List    Diagnosis Date Noted    Bipolar disorder ([facility] 04/02/2019    [facility] 04/02/2019    Arthritis 04/02/2019    Asthma 04/02/2019    Chronic kidney disease 04/02/2019    Heart disease 04/02/2019    Anemia 04/02/2019    Hypertension 04/02/2019    Cervical herniated disc 04/02/2019    Lumbar herniated disc 04/02/2019    Chronic pain 04/02/2019    GERD (gastroesophageal reflux disease) 04/02/2019    PTSD (post-traumatic stress disorder) 04/02/2019    Hypothyroidism 04/02/2019    Polypharmacy 02/28/2019     Current Outpatient Medications   Medication Sig Dispense Refill    traZODone (DESYREL) 150 mg tablet Take 2 Tabs by mouth nightly. Indications: insomnia associated with depression 90 Tab 0    losartan (COZAAR) 50 mg tablet Take 1 Tab by mouth daily. 30 Tab 0    brompheniramine-pseudoeph-DM (DIMETAPP) 2-30-10 mg/5 mL syrup Take 5 mL by mouth four (4) times daily as needed for Cough for up to 5 days. 118 mL 0    cloNIDine HCl (CATAPRES) 0.1 mg tablet Take 1 Tab by mouth two (2) times a day. 60 Tab 1    simvastatin (ZOCOR) 20 mg tablet Take 20 mg by mouth.  SUMAtriptan (IMITREX) 100 mg tablet Take 100 mg by mouth.  cholecalciferol, VITAMIN D3, (VITAMIN D3) 5,000 unit tab tablet Take  by mouth daily.  hydroCHLOROthiazide (HYDRODIURIL) 25 mg tablet Take 1 Tab by mouth daily. 90 Tab 1    levothyroxine (SYNTHROID) 75 mcg tablet Take 1 Tab by mouth Daily (before breakfast). (Patient taking differently: Take 75 mcg by mouth Daily (before breakfast). Indications: hypothyroidism) 90 Tab 1    lurasidone (LATUDA) 40 mg tab tablet Take 1 Tab by mouth daily (with breakfast). 90 Tab 1    albuterol (PROVENTIL HFA, VENTOLIN HFA, PROAIR HFA) 90 mcg/actuation inhaler Take 2 Puffs by inhalation every four (4) hours as needed for Wheezing. 1 Inhaler 3    raNITIdine (ZANTAC) 300 mg tab Take 1 Tab by mouth daily. 90 Tab 1    WIXELA INHUB 250-50 mcg/dose diskus inhaler       fesoterodine (TOVIAZ) 8 mg ER tablet Take 8 mg by mouth.  morphine CR (MS CONTIN) 30 mg CR tablet TAKE 1 TABLET BY MOUTH TWICE A DAY X1 MONTH (30 DAYS)      ondansetron hcl (ZOFRAN) 4 mg tablet Take 4 mg by mouth every eight (8) hours as needed for Nausea.  pregabalin (LYRICA) 75 mg capsule Take  by mouth.  alfuzosin SR (UROXATRAL) 10 mg SR tablet Take  by mouth daily.       lactulose (CHRONULAC) 10 gram/15 mL solution Take by mouth three (3) times daily.  cyclobenzaprine (FLEXERIL) 5 mg tablet Take 5 mg by mouth.  ALPRAZolam (XANAX) 1 mg tablet Take  by mouth. Allergies   Allergen Reactions    Amitriptyline Swelling     Pt stated that itFelt like throat was swelling/ and she was losing her mind    Amlodipine Other (comments)     Unable to sleep    Doxycycline Unknown (comments)     Past Medical History:   Diagnosis Date    Anemia     Arthritis     Asthma     Bipolar disorder (HCC)     Cervical herniated disc     Chronic kidney disease     Chronic pain     GERD (gastroesophageal reflux disease)     Heart disease     Hypertension     Hypothyroidism     Lumbar herniated disc     Polypharmacy     PTSD (post-traumatic stress disorder)     Schizophrenia ([facility]      Past Surgical History:   Procedure Laterality Date    HX CERVICAL FUSION      HX GYN      HX HEENT      HX LUMBAR FUSION      HX ORTHOPAEDIC       Family History   Problem Relation Age of Onset    Heart Disease Mother     Anemia Mother     Diabetes Mother     Diabetes Father      Social History     Tobacco Use    Smoking status: Former Smoker    Smokeless tobacco: Never Used   Substance Use Topics    Alcohol use: No     Frequency: Never        Review of Systems  Pertinent items are noted in HPI. Objective:     Visit Vitals  /87 (BP 1 Location: Left arm, BP Patient Position: At rest)   Pulse (!) 107   Temp 99.4 °F (37.4 °C)   Resp 16   Ht 5' 7\" (1.702 m)   Wt 195 lb (88.5 kg)   SpO2 99%   BMI 30.54 kg/m²     General:  alert, cooperative, no distress   Eyes: negative   Ears: normal TM's and external ear canals AU   Sinuses: Normal paranasal sinuses without tenderness   Mouth:  Lips, mucosa, and tongue normal. Teeth and gums normal   Neck: supple, symmetrical, trachea midline and no adenopathy. Heart: S1 and S2 normal, no murmurs noted, HR elevated with activity. Lungs: clear to auscultation bilaterally.   Evaluated for cause of fall. Ambulated around office. P. Ox %. HR from 86 to 120's. She was weak and wobbled and exhibited increased WOB. After the walk she was coughing constantly, had her take two puffs of her MDI and this helped resolve the cough   Abdomen: soft, non-tender. Bowel sounds normal. No masses,  no organomegaly            Assessment/Plan:   viral upper respiratory illness  Suggested symptomatic OTC remedies. RTC prn. Discussed diagnosis and treatment of viral URIs. Discussed the importance of avoiding unnecessary antibiotic therapy. ICD-10-CM ICD-9-CM     1. Orthostatic hypotension I95.1 458.0  Lying 135/83 ; Sittin/79; Standing 108/69  Positive orthostatic blood presures   2. Bipolar depression ([address] F31.9 296.50 traZODone (DESYREL) 150 mg tablet    3. Essential hypertension I10 401.9 losartan (COZAAR) 50 mg tablet Cut dose of losartan in half due to orthostatic hypotension   4. Upper respiratory tract infection, unspecified type J06.9 465.9 brompheniramine-pseudoeph-DM (DIMETAPP) 2-30-10 mg/5 mL syrup    5. Fall, initial encounter [address] Weyman Hamper I206.0  Falls are most likely due to her orthostatic hypotension     An After Visit Summary was printed and given to the patient. All diagnosis have been discussed with the patient and all of the patient's questions have been answered. Follow-up and Dispositions    · Return in about 1 week (around 2019) for hypertension, 30 minutes. Robi Cabrera, [place] [id number] [address] [place].   Kathleen Smalls

## 2019-04-02 PROBLEM — M51.26 LUMBAR HERNIATED DISC: Status: ACTIVE | Noted: 2019-04-02

## 2019-04-02 PROBLEM — M50.20 CERVICAL HERNIATED DISC: Status: ACTIVE | Noted: 2019-04-02

## 2019-04-02 PROBLEM — D64.9 ANEMIA: Status: ACTIVE | Noted: 2019-04-02

## 2019-04-02 PROBLEM — F31.9 BIPOLAR DISORDER (HCC): Status: ACTIVE | Noted: 2019-04-02

## 2019-04-02 PROBLEM — G89.29 CHRONIC PAIN: Status: ACTIVE | Noted: 2019-04-02

## 2019-04-02 PROBLEM — F20.9 SCHIZOPHRENIA (HCC): Status: ACTIVE | Noted: 2019-04-02

## 2019-04-02 PROBLEM — M19.90 ARTHRITIS: Status: ACTIVE | Noted: 2019-04-02

## 2019-04-02 PROBLEM — K21.9 GERD (GASTROESOPHAGEAL REFLUX DISEASE): Status: ACTIVE | Noted: 2019-04-02

## 2019-04-02 PROBLEM — J45.909 ASTHMA: Status: ACTIVE | Noted: 2019-04-02

## 2019-04-02 PROBLEM — E03.9 HYPOTHYROIDISM: Status: ACTIVE | Noted: 2019-04-02

## 2019-04-02 PROBLEM — I10 HYPERTENSION: Status: ACTIVE | Noted: 2019-04-02

## 2019-04-02 PROBLEM — F43.10 PTSD (POST-TRAUMATIC STRESS DISORDER): Status: ACTIVE | Noted: 2019-04-02

## 2019-04-02 PROBLEM — I51.9 HEART DISEASE: Status: ACTIVE | Noted: 2019-04-02

## 2019-04-02 PROBLEM — N18.9 CHRONIC KIDNEY DISEASE: Status: ACTIVE | Noted: 2019-04-02

## 2019-04-03 ENCOUNTER — OFFICE VISIT (OUTPATIENT)
Dept: ORTHOPEDIC SURGERY | Age: 49
End: 2019-04-03

## 2019-04-03 VITALS
TEMPERATURE: 98.5 F | HEIGHT: 67 IN | SYSTOLIC BLOOD PRESSURE: 133 MMHG | BODY MASS INDEX: 30.61 KG/M2 | DIASTOLIC BLOOD PRESSURE: 87 MMHG | RESPIRATION RATE: 20 BRPM | OXYGEN SATURATION: 98 % | WEIGHT: 195 LBS | HEART RATE: 118 BPM

## 2019-04-03 DIAGNOSIS — M96.1 CERVICAL POST-LAMINECTOMY SYNDROME: ICD-10-CM

## 2019-04-03 DIAGNOSIS — M54.16 LUMBAR NEURITIS: ICD-10-CM

## 2019-04-03 DIAGNOSIS — M54.12 BRACHIAL (CERVICAL) NEURITIS: ICD-10-CM

## 2019-04-03 DIAGNOSIS — M54.2 NECK PAIN: Primary | ICD-10-CM

## 2019-04-03 DIAGNOSIS — M96.1 LUMBAR POST-LAMINECTOMY SYNDROME: ICD-10-CM

## 2019-04-03 NOTE — LETTER
4/3/19 Patient: Anthony Howe YOB: 1970 Date of Visit: 4/3/2019 Alma Argueta MD 
[phone] [id number] VIA In Basket Dear Alma Argueta MD, Thank you for referring Ms. Anthony Howe to [address] [place] [address] for evaluation. My notes for this consultation are attached. If you have questions, please do not hesitate to call me. I look forward to following your patient along with you. Sincerely, Anoop Fonseca MD

## 2019-04-03 NOTE — PROGRESS NOTES
[facility]  [address], [place]   Phone: [phone]  Fax: [phone]        INITIAL CONSULTATION      HISTORY OF PRESENT ILLNESS:  Yariel Hobson is a 44 y.o. female whom is referred from Eyad Navarrete MD secondary to chronic, progressive neck pain extending into the LUE to the elbow x 3 years without injury. She rates her pain 10/10. Pt denies h/o neck injections. Pt completed PT about a year ago with no relief. She has been dropping things with BUE x 1 year. PmHx bipolar disorder, schizophrenia, PTSD, depression, chronic renal diease, severe migraines, lumbar spine fusion x 2, cervical spine fusion (2008, Dr. Cristy Rodgers). Pt reports she was pain free for 8 years following her fusion. She also reports that her bones never fused properly and her doctors in [place] were planning to do another cervical fusion. ER note from Dr. Stu Le dated 1/10/19 indicating patient presented for lightheadedness. Note from Eyad Navarrete MD dated 2/28/19 indicating patient was seen with c/o neck pain. Chronic low back pain. Of note, pt is not being followed by a psychiatrist. Referred to us. ER note from Dr. Therese Rivera dated 2/19/19 indicating patient presented for uncontrolled hypertension. H/o medication noncompliance. Note from Dr. Jaye Adler dated 2/6/19 indicating patient was seen with c/o chronic neck pain. Treated with Lidoderm patches at that time. The patient is RHD .  reviewed. Body mass index is 30.54 kg/m².     PCP: Eyad Navarrete MD    Past Medical History:   Diagnosis Date    Anemia     Arthritis     Asthma     [facility] disorder ([facility]     Cervical herniated disc     Chronic kidney disease     Chronic pain     GERD (gastroesophageal reflux disease)     Heart disease     Hypertension     Hypothyroidism     Lumbar herniated disc     Polypharmacy     PTSD (post-traumatic stress disorder)     Schizophrenia ([facility]    atie  Past Surgical History:   Procedure Laterality Date    HX CERVICAL FUSION      HX GYN      HX HEENT      HX LUMBAR FUSION      HX ORTHOPAEDIC     nt Medications   Medication Sig Dispense Refill    WIXELA INHUB 250-50 mcg/dose diskus inhaler       traZODone (DESYREL) 150 mg tablet Take 2 Tabs by mouth nightly. Indications: insomnia associated with depression 90 Tab 0    losartan (COZAAR) 50 mg tablet Take 1 Tab by mouth daily. 30 Tab 0    simvastatin (ZOCOR) 20 mg tablet Take 20 mg by mouth.  SUMAtriptan (IMITREX) 100 mg tablet Take 100 mg by mouth.  fesoterodine (TOVIAZ) 8 mg ER tablet Take 8 mg by mouth.  morphine CR (MS CONTIN) 30 mg CR tablet TAKE 1 TABLET BY MOUTH TWICE A DAY X1 MONTH (30 DAYS)      ondansetron hcl (ZOFRAN) 4 mg tablet Take 4 mg by mouth every eight (8) hours as needed for Nausea.  pregabalin (LYRICA) 75 mg capsule Take  by mouth.  cholecalciferol, VITAMIN D3, (VITAMIN D3) 5,000 unit tab tablet Take  by mouth daily.  lactulose (CHRONULAC) 10 gram/15 mL solution Take  by mouth three (3) times daily.  cyclobenzaprine (FLEXERIL) 5 mg tablet Take 5 mg by mouth.  hydroCHLOROthiazide (HYDRODIURIL) 25 mg tablet Take 1 Tab by mouth daily. 90 Tab 1    levothyroxine (SYNTHROID) 75 mcg tablet Take 1 Tab by mouth Daily (before breakfast). (Patient taking differently: Take 75 mcg by mouth Daily (before breakfast). Indications: hypothyroidism) 90 Tab 1    lurasidone (LATUDA) 40 mg tab tablet Take 1 Tab by mouth daily (with breakfast). 90 Tab 1    albuterol (PROVENTIL HFA, VENTOLIN HFA, PROAIR HFA) 90 mcg/actuation inhaler Take 2 Puffs by inhalation every four (4) hours as needed for Wheezing. 1 Inhaler 3    raNITIdine (ZANTAC) 300 mg tab Take 1 Tab by mouth daily. 90 Tab 1    brompheniramine-pseudoeph-DM (DIMETAPP) 2-30-10 mg/5 mL syrup Take 5 mL by mouth four (4) times daily as needed for Cough for up to 5 days.  118 mL 0    cloNIDine HCl (CATAPRES) 0.1 mg tablet Take 1 Tab by mouth two (2) times a day. 60 Tab 1    alfuzosin SR (UROXATRAL) 10 mg SR tablet Take  by mouth daily.  ALPRAZolam (XANAX) 1 mg tablet Take  by mouth. Allergies   Allergen Reactions    Amitriptyline Swelling     Pt stated that itFelt like throat was swelling/ and she was losing her mind    Amlodipine Other (comments)     Unable to sleep    Doxycycline Unknown (comments)            Family History   Problem Relation Age of Onset    Heart Disease Mother     Anemia Mother     Diabetes Mother     Diabetes Father          REVIEW OF SYSTEMS  Constitutional symptoms: Negative  Eyes: Negative  Ears, Nose, Throat, and Mouth: Negative  Cardiovascular: Negative  Respiratory: Negative  Genitourinary: Negative  Integumentary (Skin and/or breast): Negative  Musculoskeletal: Positive for neck pain, LUE pain  Extremities: Negative for edema. Endocrine/Rheumatologic: Negative  Hematologic/Lymphatic: Negative  Allergic/Immunologic: Negative  Psychiatric: Negative     Resting tremors bilateral hands. Coughing throughout physical examination. PHYSICAL EXAMINATION  Visit Vitals  /87   Pulse (!) 118   Temp 98.5 °F (36.9 °C)   Resp 20   Ht 5' 7\" (1.702 m)   Wt 88.5 kg (195 lb)   SpO2 98%   BMI 30.54 kg/m²       CONSTITUTIONAL: NAD, A&O x 3  HEART: Regular rate and rhythm  ABDOMEN: Positive bowel sounds, soft, nontender, and nondistended  LUNGS: Clear to auscultation bilaterally. SKIN: Negative for rash. RANGE OF MOTION: The patient has full passive range of motion in all four extremities. SENSATION: Decreased sensation to light touch S1 RLE. Sensation to light touch otherwise intact. MOTOR:   Straight Leg Raise: Negative, bilateral  Cuevas: Negative, bilateral  Tandem Gait: difficult to assess  Deep tendon reflexes are 0 at the brachioradialis, biceps, and triceps. Deep tendon reflexes are 0 at the knees and ankles bilaterally.      Shoulder AB/Flex Elbow Flex Wrist Ext Elbow Ext Wrist Flex Hand Intrin Tone   Right +4/5 +4/5 +4/5 +4/5 +4/5 +4/5 +4/5   Left +4/5 +4/5 +4/5 +4/5 +4/5 +4/5 +4/5              Hip Flex Knee Ext Knee Flex Ankle DF GTE Ankle PF Tone   Right +4/5 +4/5 +4/5 +4/5 +4/5 +4/5 +4/5   Left +4/5 +4/5 +4/5 +4/5 +4/5 +4/5 +4/5     RADIOGRAPHS  Preliminary reading of cervical plain films:  I do not appreciate any definite fusion of C5 through C7. No acute pathology identified. These are being sent out for official reading by Dr. Chris Lamar. ASSESSMENT   Diagnoses and all orders for this visit:    1. Neck pain  -     AMB POC XRAY, SPINE, CERVICAL; 2 OR 3  -     MRI CERV SPINE WO CONT; Future  -     MRI CERV SPINE WO CONT; Future    2. Brachial (cervical) neuritis  -     MRI CERV SPINE WO CONT; Future  -     MRI CERV SPINE WO CONT; Future    3. Cervical post-laminectomy syndrome  -     MRI CERV SPINE WO CONT; Future  -     MRI CERV SPINE WO CONT; Future    4. Lumbar neuritis  -     MRI CERV SPINE WO CONT; Future    5. Lumbar post-laminectomy syndrome  -     MRI CERV SPINE WO CONT; Future           IMPRESSIONS/RECOMMENDATIONS:  Patient presents today with c/o chronic, progressive neck pain extending into the LUE to the elbow x 3 years without injury. I will have the patient sign a release of medical information to obtain her medical records from [place]. I will set her up for an MRI of the cervical spine. I advised patient to bring copies of films to next visit. I did explain to the patient that we are not a chronic pain management practice and that I would not be prescribing her narcotics. Multiple treatment options were discussed. Patient is neurologically intact. I will see the patient back following MRI. Written by Saray Maxwell, as dictated by Mei Sheets MD  I examined the patient, reviewed and agree with the note.

## 2019-04-09 ENCOUNTER — OFFICE VISIT (OUTPATIENT)
Dept: FAMILY MEDICINE CLINIC | Age: 49
End: 2019-04-09

## 2019-04-09 VITALS
BODY MASS INDEX: 30.61 KG/M2 | HEART RATE: 99 BPM | TEMPERATURE: 97.6 F | SYSTOLIC BLOOD PRESSURE: 165 MMHG | DIASTOLIC BLOOD PRESSURE: 104 MMHG | RESPIRATION RATE: 16 BRPM | HEIGHT: 67 IN | WEIGHT: 195 LBS

## 2019-04-09 DIAGNOSIS — F41.9 ANXIETY: ICD-10-CM

## 2019-04-09 DIAGNOSIS — J45.40 MODERATE PERSISTENT ASTHMA, UNSPECIFIED WHETHER COMPLICATED: ICD-10-CM

## 2019-04-09 DIAGNOSIS — I10 ESSENTIAL HYPERTENSION: Primary | ICD-10-CM

## 2019-04-09 RX ORDER — MONTELUKAST SODIUM 10 MG/1
10 TABLET ORAL DAILY
Qty: 90 TAB | Refills: 1 | Status: SHIPPED | OUTPATIENT
Start: 2019-04-09

## 2019-04-09 RX ORDER — HYDROXYZINE 50 MG/1
50 TABLET, FILM COATED ORAL
Qty: 90 TAB | Refills: 1 | Status: SHIPPED | OUTPATIENT
Start: 2019-04-09 | End: 2019-04-19

## 2019-04-09 RX ORDER — BUSPIRONE HYDROCHLORIDE 5 MG/1
5 TABLET ORAL
Qty: 90 TAB | Refills: 2 | Status: SHIPPED | OUTPATIENT
Start: 2019-04-09

## 2019-04-09 RX ORDER — LOSARTAN POTASSIUM 100 MG/1
50 TABLET ORAL DAILY
COMMUNITY

## 2019-04-09 RX ORDER — CLONIDINE HYDROCHLORIDE 0.2 MG/1
0.2 TABLET ORAL 2 TIMES DAILY
Qty: 60 TAB | Refills: 1 | Status: SHIPPED | OUTPATIENT
Start: 2019-04-09

## 2019-04-09 NOTE — PROGRESS NOTES
[facility]    CC: F/U for HTN    HPI:     HTN:  -Taking BP medication as prescribed  -Denies any side effects or issues with her medication  -Has been checking BP at home.  Log brought in for review.  -All readings in log are stage II HTN      Asthma:  -Reports she continues to have issues with cough since her last visit  -Using inhaler numerous times (Does not wish to state how often)  -Was seen at our office for issue on 4/1/2019  -Prescribed Dimetapp      Anxiety:  -Has been an issue since she was 21years old  -Getting progressively worse as she has gotten older  -Currently on no medication  -Daily issue  -Worsen by noise and people      ROS: Positive items marked in RED  CON: fever, chills  Cardiovascular: palpitations, CP  Resp: SOB, cough  GI: nausea, vomiting, diarrhea  : dysuria, hematuria      Past Medical History:   Diagnosis Date    Anemia     Anxiety     Arthritis     Asthma     [facility] disorder ([facility]     Cervical herniated disc     Chronic kidney disease     Chronic pain     GERD (gastroesophageal reflux disease)     Heart disease     Hypertension     Hypothyroidism     Lumbar herniated disc     Polypharmacy     PTSD (post-traumatic stress disorder)     Schizophrenia ([facility]        Past Surgical History:   Procedure Laterality Date    HX CERVICAL FUSION      HX GYN      HX HEENT      HX LUMBAR FUSION      HX ORTHOPAEDIC         Family History   Problem Relation Age of Onset    Heart Disease Mother     Anemia Mother     Diabetes Mother     Diabetes Father        Social History     Socioeconomic History    Marital status: UNKNOWN     Spouse name: Not on file    Number of children: Not on file    Years of education: Not on file    Highest education level: Not on file   Tobacco Use    Smoking status: Former Smoker    Smokeless tobacco: Never Used   Substance and Sexual Activity    Alcohol use: No     Frequency: Never    Drug use: No    Sexual activity: Not Currently       Allergies   Allergen Reactions    Amitriptyline Swelling     Pt stated that itFelt like throat was swelling/ and she was losing her mind    Amlodipine Other (comments)     Unable to sleep    Doxycycline Unknown (comments)         Current Outpatient Medications:     WIXELA INHUB 250-50 mcg/dose diskus inhaler, , Disp: , Rfl:     traZODone (DESYREL) 150 mg tablet, Take 2 Tabs by mouth nightly. Indications: insomnia associated with depression (Patient taking differently: Take 350 mg by mouth nightly. Indications: insomnia associated with depression), Disp: 90 Tab, Rfl: 0    losartan (COZAAR) 50 mg tablet, Take 1 Tab by mouth daily. (Patient taking differently: Take 100 mg by mouth daily.), Disp: 30 Tab, Rfl: 0    simvastatin (ZOCOR) 20 mg tablet, Take 20 mg by mouth., Disp: , Rfl:     SUMAtriptan (IMITREX) 100 mg tablet, Take 100 mg by mouth., Disp: , Rfl:     fesoterodine (TOVIAZ) 8 mg ER tablet, Take 8 mg by mouth., Disp: , Rfl:     ondansetron hcl (ZOFRAN) 4 mg tablet, Take 4 mg by mouth every eight (8) hours as needed for Nausea., Disp: , Rfl:     cholecalciferol, VITAMIN D3, (VITAMIN D3) 5,000 unit tab tablet, Take  by mouth daily. , Disp: , Rfl:     lactulose (CHRONULAC) 10 gram/15 mL solution, Take  by mouth three (3) times daily. , Disp: , Rfl:     hydroCHLOROthiazide (HYDRODIURIL) 25 mg tablet, Take 1 Tab by mouth daily. , Disp: 90 Tab, Rfl: 1    levothyroxine (SYNTHROID) 75 mcg tablet, Take 1 Tab by mouth Daily (before breakfast). (Patient taking differently: Take 75 mcg by mouth Daily (before breakfast). Indications: hypothyroidism), Disp: 90 Tab, Rfl: 1    lurasidone (LATUDA) 40 mg tab tablet, Take 1 Tab by mouth daily (with breakfast). , Disp: 90 Tab, Rfl: 1    albuterol (PROVENTIL HFA, VENTOLIN HFA, PROAIR HFA) 90 mcg/actuation inhaler, Take 2 Puffs by inhalation every four (4) hours as needed for Wheezing., Disp: 1 Inhaler, Rfl: 3    raNITIdine (ZANTAC) 300 mg tab, Take 1 Tab by mouth daily. , Disp: 90 Tab, Rfl: 1    Physical Exam:      BP (!) 165/104   Pulse 99   Temp 97.6 °F (36.4 °C) (Oral)   Resp 16   Ht 5' 7\" (1.702 m)   Wt 195 lb (88.5 kg)   BMI 30.54 kg/m²     General: obese habitus, NAD, conversant  Eyes: sclera clear bilaterally, no discharge noted, eyelids normal in appearance  HENT: NCAT, oropharynx clear, MMM  Lungs: CTAB, normal respiratory effort and rate  CV: RRR, no MRGs  ABD: soft, non-tender, non-distended, normal bowel sounds  Ext: no peripheral edema or digital cyanosis noted  Skin: normal temperature, turgor, color, and texture  Psych: alert and oriented to person, place and situation, normal affect  Neuro: speech normal, moving all extremities, gait normal      Assessment/Plan     HTN, Inadequately controlled:  -Will increase clonidine dose to 0.2 mcg  -F/U in one month      Asthma:  -Likely inadequately controlled  -Will start on Singulair regimen  -Plan to start on ICS therapy if Asthma remains inadequately controlled at next visit  -F/U in one month      Anxiety, Inadequately Controlled:  -Will start on Buspar and Atarax regimen  -Patient will likely need to establish with psychiatry  -F/U in one month        Yesenia Gary MD  4/9/2019, 3:05 PM

## 2019-04-09 NOTE — PROGRESS NOTES
1. Have you been to the ER, urgent care clinic since your last visit? Hospitalized since your last visit? No    2. Have you seen or consulted any other health care providers outside of the [address] [place] [address] since your last visit? Include any pap smears or colon screening.  No

## 2019-04-29 ENCOUNTER — OFFICE VISIT (OUTPATIENT)
Dept: UROLOGY | Age: 49
End: 2019-04-29

## 2019-04-29 VITALS
WEIGHT: 198 LBS | DIASTOLIC BLOOD PRESSURE: 93 MMHG | HEIGHT: 67 IN | SYSTOLIC BLOOD PRESSURE: 160 MMHG | OXYGEN SATURATION: 99 % | HEART RATE: 98 BPM | BODY MASS INDEX: 31.08 KG/M2

## 2019-04-29 DIAGNOSIS — R32 URINARY INCONTINENCE, UNSPECIFIED TYPE: Primary | ICD-10-CM

## 2019-04-29 DIAGNOSIS — N32.81 OVERACTIVE BLADDER: ICD-10-CM

## 2019-04-29 DIAGNOSIS — Z01.818 PRE-OP TESTING: ICD-10-CM

## 2019-04-29 LAB
BILIRUB UR QL STRIP: NEGATIVE
GLUCOSE UR-MCNC: NEGATIVE MG/DL
KETONES P FAST UR STRIP-MCNC: NEGATIVE MG/DL
PH UR STRIP: 5.5 [PH] (ref 4.6–8)
PROT UR QL STRIP: NEGATIVE
SP GR UR STRIP: 1.01 (ref 1–1.03)
UA UROBILINOGEN AMB POC: NORMAL (ref 0.2–1)
URINALYSIS CLARITY POC: CLEAR
URINALYSIS COLOR POC: YELLOW
URINE BLOOD POC: NEGATIVE
URINE LEUKOCYTES POC: NEGATIVE
URINE NITRITES POC: NEGATIVE

## 2019-04-29 RX ORDER — BENZONATATE 200 MG/1
200 CAPSULE ORAL
COMMUNITY

## 2019-04-29 RX ORDER — HYDROXYZINE 25 MG/1
TABLET, FILM COATED ORAL
COMMUNITY
Start: 2019-04-06

## 2019-04-29 RX ORDER — MORPHINE SULFATE 15 MG/1
TABLET ORAL
COMMUNITY

## 2019-04-29 RX ORDER — PREGABALIN 75 MG/1
CAPSULE ORAL
COMMUNITY

## 2019-04-29 RX ORDER — PANTOPRAZOLE SODIUM 40 MG/1
TABLET, DELAYED RELEASE ORAL
COMMUNITY
Start: 2019-04-24
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
## 2019-04-29 NOTE — PROGRESS NOTES
Ms. Gerry Morrison has a reminder for a \"due or due soon\" health maintenance. I have asked that she contact her primary care provider for follow-up on this health maintenance.

## 2019-04-29 NOTE — PATIENT INSTRUCTIONS
Urine Test: About This Test  What is it? A urine test checks the color, clarity (clear or cloudy), odor, concentration, and acidity (pH) of your urine. It also checks your levels of protein, sugar, blood cells, or other substances in your urine. This test is sometimes called a urinalysis. Why is this test done? A urine test may be done:  · To check for a disease or infection of the urinary tract. The urinary tract includes the kidneys, the tubes that carry urine from the kidneys to the bladder (ureters), and the bladder. It also includes the tube that carries urine from the bladder to outside the body (urethra). · To check the treatment of conditions such as diabetes, kidney stones, a urinary tract infection (UTI), high blood pressure, or some kidney or liver diseases. How can you prepare for the test?  · Before the test, don't eat foods that can change the color of your urine. Examples of these include blackberries, beets, and rhubarb. · Don't do heavy exercise before the test.  · Tell your doctor if you are menstruating or close to starting your period. Your doctor may want to wait to do the test.  · Tell your doctor about all the nonprescription and prescription medicines and herbs or other supplements you take. Some of these can affect the results of this test.  What happens during the test?  A urine test can be done in your doctor's office, clinic, or lab. Or you may be asked to collect a urine sample at home. Then you can take it to the office or lab for testing. Clean-catch midstream urine collection  · Wash your hands before you start. · If the cup you are given has a lid, remove it carefully. Set it down with the inner surface up. Don't touch the inside of the cup with your fingers. · Clean the area around your genitals. ? For men: Pull back the foreskin, if present. Clean the head of your penis with medicated towelettes or swabs. ?  For women: Spread open the genital folds of skin with one hand. Then use medicated towelettes or swabs in your other hand to clean the area where urine comes out (the urethra). Wipe the area from front to back. · Start urinating into the toilet or urinal. A woman should hold apart the genital folds of skin while she urinates. · After the urine has flowed for several seconds, place the cup into the urine stream. Collect about 2 ounces of urine without stopping your flow of urine. · Don't touch the rim of the cup to your genital area. Don't get toilet paper, pubic hair, stool (feces), menstrual blood, or anything else in the urine sample. · Finish urinating into the toilet or urinal.  · Carefully replace and tighten the lid on the cup, and then return it to the lab. If you are collecting the urine at home and can't get it to the lab in an hour, refrigerate it. Double-voided urine sample collection  This method collects the urine your body is making right now. · Urinate into the toilet or urinal. Don't collect any of this urine. · Drink a large glass of water, and wait about 30 to 40 minutes. · Then get a urine sample. Follow the instructions above for collecting a clean-catch urine sample. · Take the urine sample to the lab. If you are collecting the urine at home and can't get it to the lab in an hour, refrigerate it. Follow-up care is a key part of your treatment and safety. Be sure to make and go to all appointments, and call your doctor if you are having problems. It's also a good idea to keep a list of the medicines you take. Ask your doctor when you can expect to have your test results. Where can you learn more? Go to http://leda-candido.info/. Enter R266 in the search box to learn more about \"Urine Test: About This Test.\"  Current as of: June 25, 2018  Content Version: 11.9  © 6982-3107 [employer].  Care instructions adapted under license by [employer] (which disclaims liability or warranty for this information). If you have questions about a medical condition or this instruction, always ask your healthcare professional. [place] [address] any warranty or liability for your use of this information.

## 2019-04-30 ENCOUNTER — HOSPITAL ENCOUNTER (OUTPATIENT)
Dept: MRI IMAGING | Age: 49
Discharge: HOME | End: 2019-04-30
Attending: PHYSICAL MEDICINE & REHABILITATION

## 2019-04-30 DIAGNOSIS — M54.12 BRACHIAL (CERVICAL) NEURITIS: ICD-10-CM

## 2019-04-30 DIAGNOSIS — M54.2 NECK PAIN: ICD-10-CM

## 2019-04-30 DIAGNOSIS — M96.1 CERVICAL POST-LAMINECTOMY SYNDROME: ICD-10-CM

## 2019-05-28 ENCOUNTER — DOCUMENTATION ONLY (OUTPATIENT)
Dept: UROLOGY | Age: 49
End: 2019-05-28

## 2019-05-28 NOTE — PROGRESS NOTES
Per phone call from the preadmission department patient tells them she has moved out of [facility] and will not be there for her Cystoscopy with Hydrodistension. Patient has not returned my calls to confirm this information.